# Patient Record
Sex: FEMALE | Race: NATIVE HAWAIIAN OR OTHER PACIFIC ISLANDER | HISPANIC OR LATINO | URBAN - METROPOLITAN AREA
[De-identification: names, ages, dates, MRNs, and addresses within clinical notes are randomized per-mention and may not be internally consistent; named-entity substitution may affect disease eponyms.]

---

## 2022-09-28 ENCOUNTER — OFFICE VISIT (OUTPATIENT)
Dept: FAMILY MEDICINE CLINIC | Facility: CLINIC | Age: 52
End: 2022-09-28

## 2022-09-28 VITALS
TEMPERATURE: 97.3 F | OXYGEN SATURATION: 100 % | HEIGHT: 66 IN | HEART RATE: 66 BPM | BODY MASS INDEX: 28.25 KG/M2 | SYSTOLIC BLOOD PRESSURE: 117 MMHG | DIASTOLIC BLOOD PRESSURE: 88 MMHG | RESPIRATION RATE: 16 BRPM | WEIGHT: 175.8 LBS

## 2022-09-28 DIAGNOSIS — M79.601 RIGHT ARM PAIN: Primary | ICD-10-CM

## 2022-09-28 PROCEDURE — 99213 OFFICE O/P EST LOW 20 MIN: CPT | Performed by: FAMILY MEDICINE

## 2022-09-28 RX ORDER — LIDOCAINE HYDROCHLORIDE 20 MG/ML
JELLY TOPICAL
Qty: 30 ML | Refills: 0 | Status: SHIPPED | OUTPATIENT
Start: 2022-09-28

## 2022-09-28 NOTE — PROGRESS NOTES
Name: Cassidy Yanez      : 1970      MRN: [id number]  Encounter Provider: Jose Huynh MD  Encounter Date: 2022   Encounter department: [address] [place] [address]  Right arm pain  Assessment & Plan:  6-8 month history of medial elbow pain with radiation down into the hand and wrist  Pain reproducible with resisted pronation and resisted wrist flexion  Pain likely secondary to medial epicondylitis  - Elbow brace ordered  - Topical lidocaine gel ordered  - PT ordered  - BMP ordered to check renal function in case short-medium term NSAID therapy indicated     Orders:  -     Tennis elbow strap  -     lidocaine (XYLOCAINE) 2 % topical gel; Apply to effected area as directed  -     Ambulatory Referral to Physical Therapy; Future  -     Basic metabolic panel; Future      Depression Screening and Follow-up Plan: Patient was screened for depression during today's encounter  They screened negative with a PHQ-2 score of 0  Subjective      Patient is a 47 yo F with a 6-8 month history of RIGHT sided elbow pain  She is RIGHT hand dominant  She states that the pain began slowly and has worsened over time  While living in [facility], 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 [employer], this is when she thinks the symptoms initially began  Now she works cleaning houses and does a significant amount of repetitive motion with the RIGHT arm  The pain is located at the RIGHT elbow with some radiation down into her RIGHT wrist and hand  There is some associated tingling over the same distribution  She sometimes feels mild weakness in her  strength on the effected side  She has only tried OTC NSAIDs and tylenol up to this point with minimal relief  Review of Systems   Constitutional: Negative for chills, fatigue and fever  HENT: Negative  Eyes: Negative  Respiratory: Negative for cough and shortness of breath  Cardiovascular: Negative  Gastrointestinal: Negative for diarrhea, nausea and vomiting  Endocrine: Negative for cold intolerance and heat intolerance  Genitourinary: Negative  Musculoskeletal: Positive for arthralgias (RIGHT elbow)  Negative for joint swelling, myalgias and neck stiffness  Skin: Negative for rash  Allergic/Immunologic: Negative  Neurological: Positive for weakness (Mild, intermittent weakness in  strength of the RIGHT hand)  Psychiatric/Behavioral: Negative  No current outpatient medications on file prior to visit  Objective     /88   Pulse 66   Temp (!) 97 3 °F (36 3 °C)   Resp 16   Ht 5' 5 5" (1 664 m)   Wt 79 7 kg (175 lb 12 8 oz)   SpO2 100%   BMI 28 81 kg/m²     Physical Exam  Constitutional:       Appearance: Normal appearance  HENT:      Mouth/Throat:      Mouth: Mucous membranes are moist       Pharynx: Oropharynx is clear  Eyes:      Extraocular Movements: Extraocular movements intact  Cardiovascular:      Rate and Rhythm: Normal rate and regular rhythm  Heart sounds: Normal heart sounds  Pulmonary:      Effort: Pulmonary effort is normal       Breath sounds: Normal breath sounds  Musculoskeletal:         General: No swelling, tenderness or deformity  Right shoulder: No swelling or tenderness  Normal range of motion  Left shoulder: Normal       Right upper arm: Normal       Right elbow: No swelling  Normal range of motion  No tenderness  Left elbow: Normal       Right forearm: Normal       Right wrist: Normal       Left wrist: Normal       Cervical back: Normal range of motion  Comments: Pain reproducible with resisted pronation of the RIGHT forearm and with resisted flexion of the RIGHT wrist     Skin:     General: Skin is warm and dry  Neurological:      General: No focal deficit present  Mental Status: She is alert  Motor: No weakness     Psychiatric:         Mood and Affect: Mood normal  Behavior: Behavior normal        Jose Huynh MD

## 2022-09-28 NOTE — ASSESSMENT & PLAN NOTE
6-8 month history of medial elbow pain with radiation down into the hand and wrist  Pain reproducible with resisted pronation and resisted wrist flexion  Pain likely secondary to medial epicondylitis    - Elbow brace ordered  - Topical lidocaine gel ordered  - PT ordered  - BMP ordered to check renal function in case short-medium term NSAID therapy indicated